# Patient Record
Sex: MALE | Race: WHITE | Employment: FULL TIME | URBAN - METROPOLITAN AREA
[De-identification: names, ages, dates, MRNs, and addresses within clinical notes are randomized per-mention and may not be internally consistent; named-entity substitution may affect disease eponyms.]

---

## 2017-05-15 ENCOUNTER — OFFICE VISIT (OUTPATIENT)
Dept: FAMILY MEDICINE | Facility: CLINIC | Age: 42
End: 2017-05-15
Payer: COMMERCIAL

## 2017-05-15 VITALS
BODY MASS INDEX: 27.3 KG/M2 | HEIGHT: 71 IN | WEIGHT: 195 LBS | OXYGEN SATURATION: 97 % | DIASTOLIC BLOOD PRESSURE: 66 MMHG | TEMPERATURE: 99.5 F | HEART RATE: 92 BPM | RESPIRATION RATE: 22 BRPM | SYSTOLIC BLOOD PRESSURE: 102 MMHG

## 2017-05-15 DIAGNOSIS — J45.20 INTERMITTENT ASTHMA, UNCOMPLICATED: ICD-10-CM

## 2017-05-15 DIAGNOSIS — J01.00 ACUTE NON-RECURRENT MAXILLARY SINUSITIS: Primary | ICD-10-CM

## 2017-05-15 DIAGNOSIS — R07.0 THROAT PAIN: ICD-10-CM

## 2017-05-15 LAB
DEPRECATED S PYO AG THROAT QL EIA: NORMAL
MICRO REPORT STATUS: NORMAL
SPECIMEN SOURCE: NORMAL

## 2017-05-15 PROCEDURE — 87081 CULTURE SCREEN ONLY: CPT | Performed by: PHYSICIAN ASSISTANT

## 2017-05-15 PROCEDURE — 87880 STREP A ASSAY W/OPTIC: CPT | Performed by: PHYSICIAN ASSISTANT

## 2017-05-15 PROCEDURE — 99213 OFFICE O/P EST LOW 20 MIN: CPT | Performed by: PHYSICIAN ASSISTANT

## 2017-05-15 RX ORDER — FLUTICASONE PROPIONATE 50 MCG
1-2 SPRAY, SUSPENSION (ML) NASAL DAILY
Qty: 1 BOTTLE | Refills: 3 | Status: SHIPPED | OUTPATIENT
Start: 2017-05-15 | End: 2019-03-19

## 2017-05-15 NOTE — LETTER
My Asthma Action Plan  Name: Sebastian Castro   YOB: 1975  Date: 5/15/2017   My doctor: Talya William PA-C   My clinic: [facility]        My Control Medicine:   My Rescue Medicine: Albuterol (Proair/Ventolin/Proventil) inhaler 108 (90 MCG/ACT)   My Asthma Severity: intermittent  Avoid your asthma triggers: upper respiratory infections               GREEN ZONE     Good Control    I feel good    No cough or wheeze    Can work, sleep and play without asthma symptoms       Take your asthma control medicine every day.     1. If exercise triggers your asthma, take your rescue medication    15 minutes before exercise or sports, and    During exercise if you have asthma symptoms  2. Spacer to use with inhaler: If you have a spacer, make sure to use it with your inhaler             YELLOW ZONE     Getting Worse  I have ANY of these:    I do not feel good    Cough or wheeze    Chest feels tight    Wake up at night   1. Keep taking your Green Zone medications  2. Start taking your rescue medicine:    every 20 minutes for up to 1 hour. Then every 4 hours for 24-48 hours.  3. If you stay in the Yellow Zone for more than 12-24 hours, contact your doctor.  4. If you do not return to the Green Zone in 12-24 hours or you get worse, start taking your oral steroid medicine if prescribed by your provider.           RED ZONE     Medical Alert - Get Help  I have ANY of these:    I feel awful    Medicine is not helping    Breathing getting harder    Trouble walking or talking    Nose opens wide to breathe       1. Take your rescue medicine NOW  2. If your provider has prescribed an oral steroid medicine, start taking it NOW  3. Call your doctor NOW  4. If you are still in the Red Zone after 20 minutes and you have not reached your doctor:    Take your rescue medicine again and    Call 911 or go to the emergency room right away    See your regular doctor within 2 weeks of an Emergency Room or Urgent  Care visit for follow-up treatment.        Electronically signed by: Gisselle Walsh, May 15, 2017    Annual Reminders:  Meet with Asthma Educator,  Flu Shot in the Fall, consider Pneumonia Vaccination for patients with asthma (aged 19 and older).    Pharmacy: [facility] [place], [place] - [address]                    Asthma Triggers  How To Control Things That Make Your Asthma Worse    Triggers are things that make your asthma worse.  Look at the list below to help you find your triggers and what you can do about them.  You can help prevent asthma flare-ups by staying away from your triggers.      Trigger                                                          What you can do   Cigarette Smoke  Tobacco smoke can make asthma worse. Do not allow smoking in your home, car or around you.  Be sure no one smokes at a child s day care or school.  If you smoke, ask your health care provider for ways to help you quit.  Ask family members to quit too.  Ask your health care provider for a referral to Quit Plan to help you quit smoking, or call 4-874-855-PLAN.     Colds, Flu, Bronchitis  These are common triggers of asthma. Wash your hands often.  Don t touch your eyes, nose or mouth.  Get a flu shot every year.     Dust Mites  These are tiny bugs that live in cloth or carpet. They are too small to see. Wash sheets and blankets in hot water every week.   Encase pillows and mattress in dust mite proof covers.  Avoid having carpet if you can. If you have carpet, vacuum weekly.   Use a dust mask and HEPA vacuum.   Pollen and Outdoor Mold  Some people are allergic to trees, grass, or weed pollen, or molds. Try to keep your windows closed.  Limit time out doors when pollen count is high.   Ask you health care provider about taking medicine during allergy season.     Animal Dander  Some people are allergic to skin flakes, urine or saliva from pets with fur or feathers. Keep pets with fur or feathers out of your  home.    If you can t keep the pet outdoors, then keep the pet out of your bedroom.  Keep the bedroom door closed.  Keep pets off cloth furniture and away from stuffed toys.     Mice, Rats, and Cockroaches  Some people are allergic to the waste from these pests.   Cover food and garbage.  Clean up spills and food crumbs.  Store grease in the refrigerator.   Keep food out of the bedroom.   Indoor Mold  This can be a trigger if your home has high moisture. Fix leaking faucets, pipes, or other sources of water.   Clean moldy surfaces.  Dehumidify basement if it is damp and smelly.   Smoke, Strong Odors, and Sprays  These can reduce air quality. Stay away from strong odors and sprays, such as perfume, powder, hair spray, paints, smoke incense, paint, cleaning products, candles and new carpet.   Exercise or Sports  Some people with asthma have this trigger. Be active!  Ask your doctor about taking medicine before sports or exercise to prevent symptoms.    Warm up for 5-10 minutes before and after sports or exercise.     Other Triggers of Asthma  Cold air:  Cover your nose and mouth with a scarf.  Sometimes laughing or crying can be a trigger.  Some medicines and food can trigger asthma.

## 2017-05-15 NOTE — MR AVS SNAPSHOT
After Visit Summary   5/15/2017    Sebastian Castro    MRN: [id number]           Patient Information     Date Of Birth          1975        Visit Information        Provider Department      5/15/2017 2:20 PM Talya William PA-C [facility]        Today's Diagnoses     Acute non-recurrent maxillary sinusitis    -  1    Throat pain        Intermittent asthma, uncomplicated        CARDIOVASCULAR SCREENING; LDL GOAL LESS THAN 160          Care Instructions    Encouraged mucinex/guafenisin, warm salt water gargles, cepacol spray, soothers/lozenges, sinus rinses (neilmed), flonase (2 sprays per nostril daily x 2 weeks), vitamin c, fluids and rest.  May alternate tylenol and NSAIDS (ibuprofen, advil, aleve type products) every 4-6 hours for the next few days as needed.    No need for oral antibiotic at this time, printed option though if no improvement in 5-7 days.  Return to clinic with any worsening or changes in symptoms.         Follow-ups after your visit        Follow-up notes from your care team     Return if symptoms worsen or fail to improve.      Who to contact     If you have questions or need follow up information about today's clinic visit or your schedule please contact [facility] directly at [phone].  Normal or non-critical lab and imaging results will be communicated to you by EpiVaxhart, letter or phone within 4 business days after the clinic has received the results. If you do not hear from us within 7 days, please contact the clinic through EpiVaxhart or phone. If you have a critical or abnormal lab result, we will notify you by phone as soon as possible.  Submit refill requests through [employer] Cheetah or call your pharmacy and they will forward the refill request to us. Please allow 3 business days for your refill to be completed.          Additional Information About Your Visit        White Cheetah Information     White Cheetah lets you send messages to your doctor,  "view your test results, renew your prescriptions, schedule appointments and more. To sign up, go to [email or web address] . Click on \"Log in\" on the left side of the screen, which will take you to the Welcome page. Then click on \"Sign up Now\" on the right side of the page.     You will be asked to enter the access code listed below, as well as some personal information. Please follow the directions to create your username and password.     Your access code is: [email or web address]  Expires: 2017  2:47 PM     Your access code will  in 90 days. If you need help or a new code, please call your [facility] clinic or [phone].        Care EveryWhere ID     This is your Care EveryWhere ID. This could be used by other organizations to access your [facility] medical records  [id number]        Your Vitals Were     Pulse Temperature Respirations Height Pulse Oximetry BMI (Body Mass Index)    92 99.5  F (37.5  C) (Tympanic) 22 5' 11\" (1.803 m) 97% 27.2 kg/m2       Blood Pressure from Last 3 Encounters:   05/15/17 102/66   16 115/67   10/06/15 132/69    Weight from Last 3 Encounters:   05/15/17 195 lb (88.5 kg)   16 201 lb (91.2 kg)   10/06/15 210 lb (95.3 kg)              We Performed the Following     Asthma Action Plan (AAP)     Beta strep group A culture     Strep, Rapid Screen          Today's Medication Changes          These changes are accurate as of: 5/15/17  2:47 PM.  If you have any questions, ask your nurse or doctor.               Start taking these medicines.        Dose/Directions    fluticasone 50 MCG/ACT spray   Commonly known as:  FLONASE   Used for:  Acute non-recurrent maxillary sinusitis   Started by:  Talya William PA-C        Dose:  1-2 spray   Spray 1-2 sprays into both nostrils daily   Quantity:  1 Bottle   Refills:  3         These medicines have changed or have updated prescriptions.        Dose/Directions    albuterol 108 (90 BASE) MCG/ACT Inhaler   Commonly known as:  " PROAIR HFA/PROVENTIL HFA/VENTOLIN HFA   This may have changed:  Another medication with the same name was removed. Continue taking this medication, and follow the directions you see here.   Used for:  Acute sinusitis with symptoms > 10 days   Changed by:  Albina Joseph PA-C        Dose:  2 puff   Inhale 2 puffs into the lungs every 6 hours as needed for shortness of breath / dyspnea or wheezing   Quantity:  1 Inhaler   Refills:  0            Where to get your medicines      These medications were sent to [facility] [place], [place] - [address]  [address] [facility] [address]     Phone:  [phone]     fluticasone 50 MCG/ACT spray         Some of these will need a paper prescription and others can be bought over the counter.  Ask your nurse if you have questions.     Bring a paper prescription for each of these medications     amoxicillin-clavulanate 875-125 MG per tablet                Primary Care Provider Office Phone # Fax #    Goran Harrison [phone]       [facility] [address] [facility] [address]        Thank you!     Thank you for choosing [facility]  for your care. Our goal is always to provide you with excellent care. Hearing back from our patients is one way we can continue to improve our services. Please take a few minutes to complete the written survey that you may receive in the mail after your visit with us. Thank you!             Your Updated Medication List - Protect others around you: Learn how to safely use, store and throw away your medicines at www.disposemymeds.org.          This list is accurate as of: 5/15/17  2:47 PM.  Always use your most recent med list.                   Brand Name Dispense Instructions for use    albuterol 108 (90 BASE) MCG/ACT Inhaler    PROAIR HFA/PROVENTIL HFA/VENTOLIN HFA    1 Inhaler    Inhale 2 puffs into the lungs every 6 hours as needed for shortness of breath /  dyspnea or wheezing       amoxicillin-clavulanate 875-125 MG per tablet    AUGMENTIN    20 tablet    Take 1 tablet by mouth 2 times daily       fluticasone 50 MCG/ACT spray    FLONASE    1 Bottle    Spray 1-2 sprays into both nostrils daily       Multi-vitamin Tabs tablet      Take 1 tablet by mouth daily

## 2017-05-15 NOTE — NURSING NOTE
"Chief Complaint   Patient presents with     Pharyngitis       Initial /66 (BP Location: Left arm, Patient Position: Chair, Cuff Size: Adult Large)  Pulse 92  Temp 99.5  F (37.5  C) (Tympanic)  Resp 22  Ht 5' 11\" (1.803 m)  Wt 195 lb (88.5 kg)  SpO2 97%  BMI 27.2 kg/m2 Estimated body mass index is 27.2 kg/(m^2) as calculated from the following:    Height as of this encounter: 5' 11\" (1.803 m).    Weight as of this encounter: 195 lb (88.5 kg).  Medication Reconciliation: complete     Gisselle Walsh CMA  "

## 2017-05-15 NOTE — LETTER
"[facility]   [address] [place]   [address]  [phone]      May 15, 2017      RE: Sebastian VALDOVINOS Pengindia  [address] [facility] [address]       To whom it may concern:    Sebastian Castro was seen in our clinic today. He may return to work on or about 5/17/2017.          Sincerely,    Talya \"Felipe\" Stewart OBANDO          "

## 2017-05-15 NOTE — PROGRESS NOTES
SUBJECTIVE:                                                    Sebastian Castro is a 42 year old male who presents to clinic today for the following health issues:    RESPIRATORY SYMPTOMS      Duration: 3 days    Description  sore throat, cough and ear pain both    Severity: moderate    Accompanying signs and symptoms: fatigue, pain in neck left and right sides below the ears, no appetite, white bumps at the back of throat     History (predisposing factors):  asthma    Precipitating or alleviating factors: None    Therapies tried and outcome:  none           Problem list and histories reviewed & adjusted, as indicated.  Additional history: as documented    Patient Active Problem List   Diagnosis     Other, mixed, or unspecified nondependent drug abuse, unspecified     Tobacco use disorder     CARDIOVASCULAR SCREENING; LDL GOAL LESS THAN 160     Intermittent asthma     Past Surgical History:   Procedure Laterality Date     NO HISTORY OF SURGERY         Social History   Substance Use Topics     Smoking status: Former Smoker     Years: 20.00     Types: Cigarettes     Smokeless tobacco: Never Used     Alcohol use Yes      Comment: 3 drinks per week     Family History   Problem Relation Age of Onset     C.A.D. Father 66      of MI     Alcohol/Drug Father          Current Outpatient Prescriptions   Medication Sig Dispense Refill     amoxicillin-clavulanate (AUGMENTIN) 875-125 MG per tablet Take 1 tablet by mouth 2 times daily 20 tablet 0     fluticasone (FLONASE) 50 MCG/ACT spray Spray 1-2 sprays into both nostrils daily 1 Bottle 3     albuterol (PROAIR HFA, PROVENTIL HFA, VENTOLIN HFA) 108 (90 BASE) MCG/ACT inhaler Inhale 2 puffs into the lungs every 6 hours as needed for shortness of breath / dyspnea or wheezing 1 Inhaler 0     multivitamin, therapeutic with minerals (MULTI-VITAMIN) TABS Take 1 tablet by mouth daily       Allergies   Allergen Reactions     Cats        Reviewed and updated as needed this visit by  "clinical staff  Tobacco  Allergies  Meds  Problems  Med Hx  Surg Hx  Fam Hx  Soc Hx        Reviewed and updated as needed this visit by Provider  Allergies  Meds  Problems         ROS:  Constitutional, HEENT, cardiovascular, pulmonary, gi and gu systems are negative, except as otherwise noted.    OBJECTIVE:                                                    /66 (BP Location: Left arm, Patient Position: Chair, Cuff Size: Adult Large)  Pulse 92  Temp 99.5  F (37.5  C) (Tympanic)  Resp 22  Ht 5' 11\" (1.803 m)  Wt 195 lb (88.5 kg)  SpO2 97%  BMI 27.2 kg/m2  Body mass index is 27.2 kg/(m^2).  GENERAL: healthy, alert and no distress  EYES: Eyes grossly normal to inspection, PERRL and conjunctivae and sclerae normal  HENT: ear canals and TM's normal, nose and mouth without ulcers or lesions  NECK: no adenopathy, no asymmetry, masses, or scars and thyroid normal to palpation  RESP: lungs clear to auscultation - no rales, rhonchi or wheezes  CV: regular rate and rhythm, normal S1 S2, no S3 or S4, no murmur, click or rub, no peripheral edema and peripheral pulses strong  PSYCH: mentation appears normal, affect normal/bright    Diagnostic Test Results:  Strep test - negative     ASSESSMENT/PLAN:                                                        ICD-10-CM    1. Acute non-recurrent maxillary sinusitis J01.00 amoxicillin-clavulanate (AUGMENTIN) 875-125 MG per tablet     fluticasone (FLONASE) 50 MCG/ACT spray   2. Throat pain R07.0 Strep, Rapid Screen     Beta strep group A culture   3. Intermittent asthma, uncomplicated J45.20 Asthma Action Plan (AAP)       Patient Instructions   Encouraged mucinex/guafenisin, warm salt water gargles, cepacol spray, soothers/lozenges, sinus rinses (neilmed), flonase (2 sprays per nostril daily x 2 weeks), vitamin c, fluids and rest.  May alternate tylenol and NSAIDS (ibuprofen, advil, aleve type products) every 4-6 hours for the next few days as needed.    No need for " oral antibiotic at this time, printed option though if no improvement in 5-7 days.  Return to clinic with any worsening or changes in symptoms.     Talya William PA-C  [facility]

## 2017-05-15 NOTE — PATIENT INSTRUCTIONS
Encouraged mucinex/guafenisin, warm salt water gargles, cepacol spray, soothers/lozenges, sinus rinses (neilmed), flonase (2 sprays per nostril daily x 2 weeks), vitamin c, fluids and rest.  May alternate tylenol and NSAIDS (ibuprofen, advil, aleve type products) every 4-6 hours for the next few days as needed.    No need for oral antibiotic at this time, printed option though if no improvement in 5-7 days.  Return to clinic with any worsening or changes in symptoms.

## 2017-05-16 LAB
BACTERIA SPEC CULT: NORMAL
MICRO REPORT STATUS: NORMAL
SPECIMEN SOURCE: NORMAL

## 2017-05-16 ASSESSMENT — ASTHMA QUESTIONNAIRES: ACT_TOTALSCORE: 23

## 2019-03-19 ENCOUNTER — OFFICE VISIT (OUTPATIENT)
Dept: FAMILY MEDICINE | Facility: CLINIC | Age: 44
End: 2019-03-19
Payer: COMMERCIAL

## 2019-03-19 VITALS
HEART RATE: 69 BPM | WEIGHT: 216 LBS | DIASTOLIC BLOOD PRESSURE: 62 MMHG | TEMPERATURE: 97.9 F | SYSTOLIC BLOOD PRESSURE: 109 MMHG | BODY MASS INDEX: 30.13 KG/M2 | OXYGEN SATURATION: 97 %

## 2019-03-19 DIAGNOSIS — J06.9 URI WITH COUGH AND CONGESTION: Primary | ICD-10-CM

## 2019-03-19 DIAGNOSIS — F17.200 TOBACCO USE DISORDER: ICD-10-CM

## 2019-03-19 DIAGNOSIS — F43.21 GRIEF: ICD-10-CM

## 2019-03-19 DIAGNOSIS — R53.83 FATIGUE, UNSPECIFIED TYPE: ICD-10-CM

## 2019-03-19 DIAGNOSIS — J45.20 MILD INTERMITTENT ASTHMA WITHOUT COMPLICATION: ICD-10-CM

## 2019-03-19 PROCEDURE — 99214 OFFICE O/P EST MOD 30 MIN: CPT | Performed by: FAMILY MEDICINE

## 2019-03-19 RX ORDER — ALBUTEROL SULFATE 90 UG/1
2 AEROSOL, METERED RESPIRATORY (INHALATION) EVERY 6 HOURS PRN
Qty: 6.7 G | Refills: 0 | Status: CANCELLED | OUTPATIENT
Start: 2019-03-19

## 2019-03-19 RX ORDER — BENZONATATE 200 MG/1
200 CAPSULE ORAL 3 TIMES DAILY PRN
Qty: 21 CAPSULE | Refills: 0 | Status: SHIPPED | OUTPATIENT
Start: 2019-03-19 | End: 2019-03-26

## 2019-03-19 NOTE — LETTER
My Asthma Action Plan  Name: Sebastian Castro   YOB: 1975  Date: 3/19/2019   My doctor: Anu Carter MD   My clinic: [facility]        My Control Medicine: None  My Rescue Medicine: Albuterol (Proair/Ventolin/Proventil) inhaler 2 puffs every 6 hrs as needed   My Asthma Severity: intermittent  Avoid your asthma triggers: upper respiratory infections               GREEN ZONE   Good Control    I feel good    No cough or wheeze    Can work, sleep and play without asthma symptoms       Take your asthma control medicine every day.     1. If exercise triggers your asthma, take your rescue medication    15 minutes before exercise or sports, and    During exercise if you have asthma symptoms  2. Spacer to use with inhaler: If you have a spacer, make sure to use it with your inhaler             YELLOW ZONE Getting Worse  I have ANY of these:    I do not feel good    Cough or wheeze    Chest feels tight    Wake up at night   1. Keep taking your Green Zone medications  2. Start taking your rescue medicine:    every 20 minutes for up to 1 hour. Then every 4 hours for 24-48 hours.  3. If you stay in the Yellow Zone for more than 12-24 hours, contact your doctor.  4. If you do not return to the Green Zone in 12-24 hours or you get worse, start taking your oral steroid medicine if prescribed by your provider.           RED ZONE Medical Alert - Get Help  I have ANY of these:    I feel awful    Medicine is not helping    Breathing getting harder    Trouble walking or talking    Nose opens wide to breathe       1. Take your rescue medicine NOW  2. If your provider has prescribed an oral steroid medicine, start taking it NOW  3. Call your doctor NOW  4. If you are still in the Red Zone after 20 minutes and you have not reached your doctor:    Take your rescue medicine again and    Call 911 or go to the emergency room right away    See your regular doctor within 2 weeks of an Emergency Room or Urgent Care  visit for follow-up treatment.          Annual Reminders:  Meet with Asthma Educator,  Flu Shot in the Fall, consider Pneumonia Vaccination for patients with asthma (aged 19 and older).    [place]arm[place]: [facility] [place] - [address]                      Asthma Triggers  How To Control Things That Make Your Asthma Worse    Triggers are things that make your asthma worse.  Look at the list below to help you find your triggers and what you can do about them.  You can help prevent asthma flare-ups by staying away from your triggers.      Trigger                                                          What you can do   Cigarette Smoke  Tobacco smoke can make asthma worse. Do not allow smoking in your home, car or around you.  Be sure no one smokes at a child s day care or school.  If you smoke, ask your health care provider for ways to help you quit.  Ask family members to quit too.  Ask your health care provider for a referral to Quit Plan to help you quit smoking, or call 8-195-499-PLAN.     Colds, Flu, Bronchitis  These are common triggers of asthma. Wash your hands often.  Don t touch your eyes, nose or mouth.  Get a flu shot every year.     Dust Mites  These are tiny bugs that live in cloth or carpet. They are too small to see. Wash sheets and blankets in hot water every week.   Encase pillows and mattress in dust mite proof covers.  Avoid having carpet if you can. If you have carpet, vacuum weekly.   Use a dust mask and HEPA vacuum.   Pollen and Outdoor Mold  Some people are allergic to trees, grass, or weed pollen, or molds. Try to keep your windows closed.  Limit time out doors when pollen count is high.   Ask you health care provider about taking medicine during allergy season.     Animal Dander  Some people are allergic to skin flakes, urine or saliva from pets with fur or feathers. Keep pets with fur or feathers out of your home.    If you can t keep the pet outdoors, then keep the  pet out of your bedroom.  Keep the bedroom door closed.  Keep pets off cloth furniture and away from stuffed toys.     Mice, Rats, and Cockroaches  Some people are allergic to the waste from these pests.   Cover food and garbage.  Clean up spills and food crumbs.  Store grease in the refrigerator.   Keep food out of the bedroom.   Indoor Mold  This can be a trigger if your home has high moisture. Fix leaking faucets, pipes, or other sources of water.   Clean moldy surfaces.  Dehumidify basement if it is damp and smelly.   Smoke, Strong Odors, and Sprays  These can reduce air quality. Stay away from strong odors and sprays, such as perfume, powder, hair spray, paints, smoke incense, paint, cleaning products, candles and new carpet.   Exercise or Sports  Some people with asthma have this trigger. Be active!  Ask your doctor about taking medicine before sports or exercise to prevent symptoms.    Warm up for 5-10 minutes before and after sports or exercise.     Other Triggers of Asthma  Cold air:  Cover your nose and mouth with a scarf.  Sometimes laughing or crying can be a trigger.  Some medicines and food can trigger asthma.
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
## 2019-03-19 NOTE — PATIENT INSTRUCTIONS
Viral bronchitis   No pneumonia  possible influenza but no treatment needed given  Tessalon three times a day for cough  Try below  If not better in 5 days call about an antibiotic   flonase 1 spray each nostril daily 2 weeks  Zyrtec 10 mg daily 2 week  mucinex 600 mg twice a day 1 week  Humidifier in room may help  supportive care as below  Go to the Er if worse  Follow up with primary if symptoms persist   acute grief playing a role  Smoking cessation when feeling better     Your symptoms and exam today indicate that you have a viral upper respiratory illness.  This includes viral rhinosinusitis and viral bronchitis.  Antibiotics do not help viral illnesses; the best remedies treat the symptoms (see below).  The typical course of a viral illness is that you feel rather miserable for the first few days - with sore throat, runny nose/nasal congestion, cough, and sometimes fever and body aches.  You should start to feel better after about 5-7 days and much better by 10-14 days.  If you develop sudden worsening of symptoms or fever after the first 5-7 days, or if you have persistence of your symptoms beyond 14 days, let us know as you may have developed a secondary bacterial infection.      For symptom relief I suggest tryin. Steam.  Take a long, hot shower.  Or if you don't want to get in the shower just run it with the bathroom door shut for a few minutes and breathe the steam.  2. Drink hot liquids frequently such as tea or hot water with honey and lemon.  3. Acetaminophen (Tylenol) and ibuprofen (Motrin or Advil) as needed for headache, sore throat, body aches, or fever.  4. For loosening phlegm and sputum try guaifenesin (available in many combination products and alone as plain Robitussin or plain Mucinex) and for cough suppression you can try dextromethorphan (Delsym or combined in other products).  5. For nasal congestion try:    An oral decongestant.  The only decongestant I recommend is pseudoephedrine.  Ask the pharmacist for the over the counter (but real) pseudoephedrine - not phenylephrine.  This can raise your blood pressure and heart rate so do not use this if you have hypertension.       Afrin spray for 3 days.  (Never use afrin nasal spray for more than 3 days as there is a risk of developing tolerance and rebound/worsening nasal congestion if used longer than this.)    Nealmed sinus rinses.    Nasal steroid spray such as nasacort or flonase, which are over-the-counter.  6. And most importantly: plenty of rest and sleep  especially while you have a fever.     Stop smoking and avoid secondhand smoke    Drink lots of fluids such as water and clear soups. Fluids help loosen mucus. Fluids are also important because they help prevent dehydration.    Gargle with warm salt water a few times a day to relieve a sore throat. Throat sprays or lozenges may also help relieve the pain.    Avoid alcohol.    Use saline (salt water) nose drops to help loosen mucus and moisten the tender skin in your nose.  Encouraged mucinex, warm salt water gargles, cepacol spray, soothers/lozenges, sinus rinses (neilmed), flonase (2 sprays per nostril daily x 2 weeks), vitamin c, fluids and rest.  May alternate tylenol and NSAIDS (ibuprofen, advil, aleve type products) every 4-6 hours for the next few days as needed.   No need for oral antibiotic at this time.

## 2019-03-20 ASSESSMENT — ASTHMA QUESTIONNAIRES: ACT_TOTALSCORE: 23

## 2019-04-09 ENCOUNTER — HOSPITAL ENCOUNTER (EMERGENCY)
Facility: CLINIC | Age: 44
Discharge: HOME | End: 2019-04-09
Attending: EMERGENCY MEDICINE | Admitting: EMERGENCY MEDICINE
Payer: COMMERCIAL

## 2019-04-09 ENCOUNTER — APPOINTMENT (OUTPATIENT)
Dept: GENERAL RADIOLOGY | Facility: CLINIC | Age: 44
End: 2019-04-09
Attending: EMERGENCY MEDICINE
Payer: COMMERCIAL

## 2019-04-09 VITALS
HEIGHT: 72 IN | HEART RATE: 76 BPM | WEIGHT: 213.5 LBS | TEMPERATURE: 98.9 F | RESPIRATION RATE: 16 BRPM | SYSTOLIC BLOOD PRESSURE: 100 MMHG | OXYGEN SATURATION: 98 % | DIASTOLIC BLOOD PRESSURE: 64 MMHG | BODY MASS INDEX: 28.92 KG/M2

## 2019-04-09 DIAGNOSIS — J45.21 EXACERBATION OF INTERMITTENT ASTHMA, UNSPECIFIED ASTHMA SEVERITY: ICD-10-CM

## 2019-04-09 DIAGNOSIS — R06.00 DYSPNEA, UNSPECIFIED TYPE: ICD-10-CM

## 2019-04-09 LAB
ALBUMIN SERPL-MCNC: 4.3 G/DL (ref 3.4–5)
ALP SERPL-CCNC: 69 U/L (ref 40–150)
ALT SERPL W P-5'-P-CCNC: 33 U/L (ref 0–70)
ANION GAP SERPL CALCULATED.3IONS-SCNC: 12 MMOL/L (ref 3–14)
AST SERPL W P-5'-P-CCNC: 11 U/L (ref 0–45)
BASE EXCESS BLDV CALC-SCNC: 0.4 MMOL/L
BASOPHILS # BLD AUTO: 0.1 10E9/L (ref 0–0.2)
BASOPHILS NFR BLD AUTO: 0.4 %
BILIRUB SERPL-MCNC: 0.6 MG/DL (ref 0.2–1.3)
BUN SERPL-MCNC: 10 MG/DL (ref 7–30)
CALCIUM SERPL-MCNC: 9 MG/DL (ref 8.5–10.1)
CHLORIDE SERPL-SCNC: 108 MMOL/L (ref 94–109)
CO2 SERPL-SCNC: 20 MMOL/L (ref 20–32)
CREAT SERPL-MCNC: 0.9 MG/DL (ref 0.66–1.25)
DIFFERENTIAL METHOD BLD: ABNORMAL
EOSINOPHIL # BLD AUTO: 0.2 10E9/L (ref 0–0.7)
EOSINOPHIL NFR BLD AUTO: 1.4 %
ERYTHROCYTE [DISTWIDTH] IN BLOOD BY AUTOMATED COUNT: 12.8 % (ref 10–15)
GFR SERPL CREATININE-BSD FRML MDRD: >90 ML/MIN/{1.73_M2}
GLUCOSE SERPL-MCNC: 104 MG/DL (ref 70–99)
HCO3 BLDV-SCNC: 22 MMOL/L (ref 21–28)
HCT VFR BLD AUTO: 42.7 % (ref 40–53)
HGB BLD-MCNC: 14.9 G/DL (ref 13.3–17.7)
IMM GRANULOCYTES # BLD: 0.1 10E9/L (ref 0–0.4)
IMM GRANULOCYTES NFR BLD: 0.4 %
LYMPHOCYTES # BLD AUTO: 3.2 10E9/L (ref 0.8–5.3)
LYMPHOCYTES NFR BLD AUTO: 23.1 %
MCH RBC QN AUTO: 31.9 PG (ref 26.5–33)
MCHC RBC AUTO-ENTMCNC: 34.9 G/DL (ref 31.5–36.5)
MCV RBC AUTO: 91 FL (ref 78–100)
MONOCYTES # BLD AUTO: 0.9 10E9/L (ref 0–1.3)
MONOCYTES NFR BLD AUTO: 6.3 %
NEUTROPHILS # BLD AUTO: 9.5 10E9/L (ref 1.6–8.3)
NEUTROPHILS NFR BLD AUTO: 68.4 %
NRBC # BLD AUTO: 0 10*3/UL
NRBC BLD AUTO-RTO: 0 /100
O2/TOTAL GAS SETTING VFR VENT: ABNORMAL %
PCO2 BLDV: 27 MM HG (ref 40–50)
PH BLDV: 7.52 PH (ref 7.32–7.43)
PLATELET # BLD AUTO: 266 10E9/L (ref 150–450)
PO2 BLDV: 64 MM HG (ref 25–47)
POTASSIUM SERPL-SCNC: 3.8 MMOL/L (ref 3.4–5.3)
PROT SERPL-MCNC: 7.2 G/DL (ref 6.8–8.8)
RBC # BLD AUTO: 4.67 10E12/L (ref 4.4–5.9)
SODIUM SERPL-SCNC: 140 MMOL/L (ref 133–144)
TROPONIN I SERPL-MCNC: <0.015 UG/L (ref 0–0.04)
WBC # BLD AUTO: 13.9 10E9/L (ref 4–11)

## 2019-04-09 PROCEDURE — 84484 ASSAY OF TROPONIN QUANT: CPT | Performed by: EMERGENCY MEDICINE

## 2019-04-09 PROCEDURE — 94640 AIRWAY INHALATION TREATMENT: CPT | Performed by: EMERGENCY MEDICINE

## 2019-04-09 PROCEDURE — 93005 ELECTROCARDIOGRAM TRACING: CPT | Mod: 59 | Performed by: EMERGENCY MEDICINE

## 2019-04-09 PROCEDURE — 80053 COMPREHEN METABOLIC PANEL: CPT | Performed by: EMERGENCY MEDICINE

## 2019-04-09 PROCEDURE — 96360 HYDRATION IV INFUSION INIT: CPT | Mod: 59 | Performed by: EMERGENCY MEDICINE

## 2019-04-09 PROCEDURE — 25000132 ZZH RX MED GY IP 250 OP 250 PS 637

## 2019-04-09 PROCEDURE — 25000125 ZZHC RX 250: Performed by: EMERGENCY MEDICINE

## 2019-04-09 PROCEDURE — 99285 EMERGENCY DEPT VISIT HI MDM: CPT | Mod: 25 | Performed by: EMERGENCY MEDICINE

## 2019-04-09 PROCEDURE — 71046 X-RAY EXAM CHEST 2 VIEWS: CPT

## 2019-04-09 PROCEDURE — 93308 TTE F-UP OR LMTD: CPT | Performed by: EMERGENCY MEDICINE

## 2019-04-09 PROCEDURE — 93010 ELECTROCARDIOGRAM REPORT: CPT | Performed by: EMERGENCY MEDICINE

## 2019-04-09 PROCEDURE — 96361 HYDRATE IV INFUSION ADD-ON: CPT | Performed by: EMERGENCY MEDICINE

## 2019-04-09 PROCEDURE — 25000131 ZZH RX MED GY IP 250 OP 636 PS 637: Performed by: EMERGENCY MEDICINE

## 2019-04-09 PROCEDURE — 82803 BLOOD GASES ANY COMBINATION: CPT | Performed by: EMERGENCY MEDICINE

## 2019-04-09 PROCEDURE — 25800030 ZZH RX IP 258 OP 636: Performed by: EMERGENCY MEDICINE

## 2019-04-09 PROCEDURE — 85025 COMPLETE CBC W/AUTO DIFF WBC: CPT | Performed by: EMERGENCY MEDICINE

## 2019-04-09 PROCEDURE — 25000125 ZZHC RX 250

## 2019-04-09 RX ORDER — PREDNISONE 20 MG/1
TABLET ORAL
Qty: 8 TABLET | Refills: 0 | Status: SHIPPED | OUTPATIENT
Start: 2019-04-09 | End: 2019-10-08

## 2019-04-09 RX ORDER — ASPIRIN 325 MG
325 TABLET ORAL ONCE
Status: COMPLETED | OUTPATIENT
Start: 2019-04-09 | End: 2019-04-09

## 2019-04-09 RX ORDER — PREDNISONE 20 MG/1
TABLET ORAL
Qty: 10 TABLET | Refills: 0 | Status: SHIPPED | OUTPATIENT
Start: 2019-04-09 | End: 2019-04-09

## 2019-04-09 RX ORDER — IPRATROPIUM BROMIDE AND ALBUTEROL SULFATE 2.5; .5 MG/3ML; MG/3ML
3 SOLUTION RESPIRATORY (INHALATION) ONCE
Status: COMPLETED | OUTPATIENT
Start: 2019-04-09 | End: 2019-04-09

## 2019-04-09 RX ORDER — PREDNISONE 20 MG/1
40 TABLET ORAL ONCE
Status: COMPLETED | OUTPATIENT
Start: 2019-04-09 | End: 2019-04-09

## 2019-04-09 RX ORDER — ASPIRIN 81 MG/1
TABLET, CHEWABLE ORAL
Status: COMPLETED
Start: 2019-04-09 | End: 2019-04-09

## 2019-04-09 RX ORDER — IPRATROPIUM BROMIDE AND ALBUTEROL SULFATE 2.5; .5 MG/3ML; MG/3ML
SOLUTION RESPIRATORY (INHALATION)
Status: COMPLETED
Start: 2019-04-09 | End: 2019-04-09

## 2019-04-09 RX ADMIN — Medication 324 MG: at 18:53

## 2019-04-09 RX ADMIN — IPRATROPIUM BROMIDE AND ALBUTEROL SULFATE 3 ML: .5; 3 SOLUTION RESPIRATORY (INHALATION) at 19:20

## 2019-04-09 RX ADMIN — SODIUM CHLORIDE 1000 ML: 9 INJECTION, SOLUTION INTRAVENOUS at 20:11

## 2019-04-09 RX ADMIN — IPRATROPIUM BROMIDE AND ALBUTEROL SULFATE: .5; 3 SOLUTION RESPIRATORY (INHALATION) at 18:46

## 2019-04-09 RX ADMIN — ASPIRIN 81 MG 324 MG: 81 TABLET ORAL at 18:53

## 2019-04-09 RX ADMIN — PREDNISONE 40 MG: 20 TABLET ORAL at 19:20

## 2019-04-09 ASSESSMENT — MIFFLIN-ST. JEOR: SCORE: 1891.43

## 2019-04-09 NOTE — ED AVS SNAPSHOT
[facility], [place], Emergency Department  [address]  [place] [address]  Phone:  [phone]  Fax:  [phone]                                    Sebastian Castro   MRN: [id number]    Department:  [facility], Emergency Department   Date of Visit:  4/9/2019           After Visit Summary Signature Page    I have received my discharge instructions, and my questions have been answered. I have discussed any challenges I see with this plan with the nurse or doctor.    ..........................................................................................................................................  Patient/Patient Representative Signature      ..........................................................................................................................................  Patient Representative Print Name and Relationship to Patient    ..................................................               ................................................  Date                                   Time    ..........................................................................................................................................  Reviewed by Signature/Title    ...................................................              ..............................................  Date                                               Time          22EPIC Rev 08/18

## 2019-04-09 NOTE — LETTER
April 9, 2019      To Whom It May Concern:      Sebastian Castro was seen in our Emergency Department today, 04/09/19.  I expect his condition to improve over the next 2-3 days.  He may return to work/school when improved.    Sincerely,        Lisa Lewis MD

## 2019-04-10 LAB — INTERPRETATION ECG - MUSE: NORMAL

## 2019-04-10 NOTE — DISCHARGE INSTRUCTIONS
Take prednisone 40 mg once daily for the next four days starting tomorrow  Albuterol inhaler every 4-6 hours as needed for difficulty breathing  Follow up with your primary doctor in 2-3 days  Return to the emergency department for worsening chest pain, nausea/vomiting, fever, difficulty breathing, or if you have any new or changing symptoms/concerns.

## 2019-04-10 NOTE — ED PROVIDER NOTES
History     Chief Complaint   Patient presents with     Chest Wall Pain     Pt has had a URI     HPI  Sebastian Castro is a 44 year old male who presented with dyspnea.  He is a daily smoker with a history of reactive airway disease.  He has felt ill for the past few days with cough and congestion.  Today he is complaining of gradual progressive worsening of whole body aches and dyspnea associated with cough and chest tightness. He denies fever, c/o chills.  No recent travel, rashes, leg swelling, no history of blood clots, no hemoptysis.  No history of cardiac disease.    I have reviewed the Medications, Allergies, Past Medical and Surgical History, and Social History in the Epic system.    Review of Systems   Constitutional: Negative for fever.   HENT: Positive for congestion.    Eyes: Negative for visual disturbance.   Respiratory: Positive for cough, chest tightness and shortness of breath.    Cardiovascular: Negative for chest pain and leg swelling.   Gastrointestinal: Negative for abdominal pain, diarrhea, nausea and vomiting.   Genitourinary: Negative for dysuria.   Musculoskeletal: Negative for neck pain and neck stiffness.   Skin: Negative for rash.   Neurological: Negative for weakness, numbness and headaches.   Psychiatric/Behavioral: Negative for confusion.       Physical Exam   BP: 118/82  Pulse: 86  Temp: 98.7  F (37.1  C)  Resp: 22  Height: 182.9 cm (6')  Weight: 96.8 kg (213 lb 8 oz)  SpO2: 99 %      Physical Exam   Constitutional: He is oriented to person, place, and time. He appears well-developed and well-nourished.   HENT:   Head: Normocephalic and atraumatic.   Mouth/Throat: Oropharynx is clear and moist.   Eyes: Pupils are equal, round, and reactive to light.   Neck: Normal range of motion.   Cardiovascular: Normal rate and regular rhythm.   Pulmonary/Chest:   Tachypneic, scattered wheezes   Abdominal: Soft.   Musculoskeletal: Normal range of motion. He exhibits no edema.   Neurological: He is  alert and oriented to person, place, and time.   Skin: Skin is warm and dry.   Psychiatric:   anxious       ED Course        Procedures             EKG Interpretation:      Interpreted by Lisa Lewis  Time reviewed: 1830  Symptoms at time of EKG: dyspnea   Rhythm: normal sinus   Rate: normal  Axis: normal  Ectopy: none  Conduction: normal  ST Segments/ T Waves: No ST-T wave changes  Q Waves: none  Comparison to prior: sinus stacy no longer present    Clinical Impression: normal EKG          Critical Care time:  none             Labs Ordered and Resulted from Time of ED Arrival Up to the Time of Departure from the ED   CBC WITH PLATELETS DIFFERENTIAL - Abnormal; Notable for the following components:       Result Value    WBC 13.9 (*)     Absolute Neutrophil 9.5 (*)     All other components within normal limits   COMPREHENSIVE METABOLIC PANEL - Abnormal; Notable for the following components:    Glucose 104 (*)     All other components within normal limits   BLOOD GAS VENOUS - Abnormal; Notable for the following components:    Ph Venous 7.52 (*)     PCO2 Venous 27 (*)     PO2 Venous 64 (*)     All other components within normal limits   TROPONIN I            Assessments & Plan (with Medical Decision Making)   Mr Castro is a 43 yo man with a PMH of asthma and dialy smoking who presents today with dyspnea and body aches.  He has a few wheezes on exam, is not hypoxic.  He is distressed by the body aches.  DDX includes ACS, pneumonia, asthma exacerbation, viral illness, CHF. No B lines and good heart function on POCUS.  Low risk for PE.  EKG appears WNL.  Will obtain troponin, CXR, labs, VBG, give duonebs and steroids, reassess.    Progress note: CXR shows no pneumonia, labs WNL, VBG shows respiratory alkalosis.  Patient appears improved.  Suspect asthma exacerbation in setting of viral illness. Plan to discharge with steroids, patient has albuterol inhaler at home, understands to f/u with PMD and return to ED for  new/worsening symptoms.  Lisa Lewis MD on 4/11/2019 at 11:38 AM     I have reviewed the nursing notes.    I have reviewed the findings, diagnosis, plan and need for follow up with the patient.       Medication List      Started    predniSONE 20 MG tablet  Commonly known as:  DELTASONE  Take two tablets (= 40mg) each day for 4 (four) days starting tomorrow        ASK your doctor about these medications    benzonatate 200 MG capsule  Commonly known as:  TESSALON  200 mg, Oral, 3 TIMES DAILY PRN  Ask about: Should I take this medication?            Final diagnoses:   Dyspnea, unspecified type   Exacerbation of intermittent asthma, unspecified asthma severity       4/9/2019   [facility], [place], EMERGENCY DEPARTMENT     Lisa Lewis MD  04/11/19 1132

## 2019-04-11 ASSESSMENT — ENCOUNTER SYMPTOMS
CHEST TIGHTNESS: 1
FEVER: 0
VOMITING: 0
NAUSEA: 0
WEAKNESS: 0
HEADACHES: 0
COUGH: 1
DIARRHEA: 0
NECK STIFFNESS: 0
CONFUSION: 0
NECK PAIN: 0
DYSURIA: 0
SHORTNESS OF BREATH: 1
ABDOMINAL PAIN: 0
NUMBNESS: 0

## 2019-10-08 ENCOUNTER — OFFICE VISIT (OUTPATIENT)
Dept: FAMILY MEDICINE | Facility: CLINIC | Age: 44
End: 2019-10-08
Payer: COMMERCIAL

## 2019-10-08 VITALS
DIASTOLIC BLOOD PRESSURE: 62 MMHG | SYSTOLIC BLOOD PRESSURE: 130 MMHG | TEMPERATURE: 98.5 F | HEART RATE: 87 BPM | BODY MASS INDEX: 28.48 KG/M2 | WEIGHT: 210 LBS | OXYGEN SATURATION: 98 %

## 2019-10-08 DIAGNOSIS — R21 RASH AND NONSPECIFIC SKIN ERUPTION: Primary | ICD-10-CM

## 2019-10-08 PROCEDURE — 99214 OFFICE O/P EST MOD 30 MIN: CPT | Performed by: FAMILY MEDICINE

## 2019-10-08 RX ORDER — CLOTRIMAZOLE 1 %
CREAM (GRAM) TOPICAL 2 TIMES DAILY
Qty: 40 G | Refills: 1 | Status: SHIPPED | OUTPATIENT
Start: 2019-10-08

## 2019-10-08 RX ORDER — TRIAMCINOLONE ACETONIDE 1 MG/G
CREAM TOPICAL 2 TIMES DAILY
Qty: 45 G | Refills: 0 | Status: SHIPPED | OUTPATIENT
Start: 2019-10-08

## 2019-10-08 NOTE — PROGRESS NOTES
Subjective     Sebastian Castro is a 44 year old male who presents to clinic today for the following health issues:    HPI   Rash  Onset: over a month     Description:   Location: inside of both thighs, groin area   Character: round, red  Itching (Pruritis): YES    Progression of Symptoms:  same and constant    Accompanying Signs & Symptoms:  Fever: no   Body aches or joint pain: no   Sore throat symptoms: no   Recent cold symptoms: no     History:   Previous similar rash: no     Precipitating factors:   Exposure to similar rash: no   New exposures: None   Recent travel: no     Alleviating factors: no     Therapies Tried and outcome: hydrocortisone cream, Vaseline,      For one month he has had a rash on b/l inner thighs, groin and around the perineum. Rash is pruritic. Due to itching rash is now painful. He has tried cortisone, Vaseline and diaper rash which haven't been helpful. He takes a shower every day. He did change detergents. Over the last week he has noticed drainage in the inner groin.     Reviewed and updated as needed this visit by Provider         Review of Systems   ROS COMP: Constitutional, HEENT, cardiovascular, pulmonary, gi and gu systems are negative, except as otherwise noted.      Objective    There were no vitals taken for this visit.  There is no height or weight on file to calculate BMI.  Physical Exam   /62   Pulse 87   Temp 98.5  F (36.9  C) (Oral)   Wt 95.3 kg (210 lb)   SpO2 98%   BMI 28.48 kg/m    GENERAL: healthy, alert and no distress  EYES: Eyes grossly normal to inspection  HENT: nose and mouth without ulcers or lesions  MS: no gross musculoskeletal defects noted  SKIN: b/l erythematous patch on inner groin folds and rectal area     Diagnostic Test Results:        Assessment & Plan       Rash and nonspecific skin eruption  - recommended trial of below   - triamcinolone (KENALOG) 0.1 % external cream; Apply topically 2 times daily  Dispense: 45 g; Refill: 0  - clotrimazole  (LOTRIMIN) 1 % external cream; Apply topically 2 times daily  Dispense: 40 g; Refill: 1  - advised below  Please stop using Vaseline and hydrocortisone    Ok to use over the counter zyrtec or Claritin daily as needed for itching.   Triamcinolone small amount twice daily for 14 days  Clotrimazole twice daily for up to 6 weeks  Call me if there is no improvement over the next one week     Return in about 1 week (around 10/15/2019) for sympotms are not improving.    Elisa Paez MD  [facility]

## 2019-10-08 NOTE — PATIENT INSTRUCTIONS
Please stop using Vaseline and hydrocortisone    Ok to use over the counter zyrtec or Claritin daily as needed for itching.   Triamcinolone small amount twice daily for 14 days  Clotrimazole twice daily for up to 6 weeks  Call me if there is no improvement over the next one week

## 2020-01-21 ENCOUNTER — OFFICE VISIT (OUTPATIENT)
Dept: FAMILY MEDICINE | Facility: CLINIC | Age: 45
End: 2020-01-21
Payer: COMMERCIAL

## 2020-01-21 VITALS
DIASTOLIC BLOOD PRESSURE: 74 MMHG | TEMPERATURE: 98 F | WEIGHT: 215.5 LBS | HEART RATE: 94 BPM | SYSTOLIC BLOOD PRESSURE: 122 MMHG | OXYGEN SATURATION: 97 % | RESPIRATION RATE: 16 BRPM | BODY MASS INDEX: 29.23 KG/M2

## 2020-01-21 DIAGNOSIS — R05.9 COUGH: ICD-10-CM

## 2020-01-21 DIAGNOSIS — Z72.0 TOBACCO ABUSE: ICD-10-CM

## 2020-01-21 DIAGNOSIS — R50.9 FEVER, UNSPECIFIED FEVER CAUSE: ICD-10-CM

## 2020-01-21 DIAGNOSIS — J40 BRONCHITIS: Primary | ICD-10-CM

## 2020-01-21 DIAGNOSIS — J45.21 MILD INTERMITTENT ASTHMA WITH EXACERBATION: ICD-10-CM

## 2020-01-21 LAB
FLUAV+FLUBV AG SPEC QL: NEGATIVE
FLUAV+FLUBV AG SPEC QL: NEGATIVE
SPECIMEN SOURCE: NORMAL

## 2020-01-21 PROCEDURE — 99214 OFFICE O/P EST MOD 30 MIN: CPT | Performed by: NURSE PRACTITIONER

## 2020-01-21 PROCEDURE — 87804 INFLUENZA ASSAY W/OPTIC: CPT | Performed by: NURSE PRACTITIONER

## 2020-01-21 RX ORDER — AZITHROMYCIN 250 MG/1
TABLET, FILM COATED ORAL
Qty: 6 TABLET | Refills: 0 | Status: SHIPPED | OUTPATIENT
Start: 2020-01-21

## 2020-01-21 RX ORDER — PREDNISONE 20 MG/1
20 TABLET ORAL 2 TIMES DAILY
Qty: 10 TABLET | Refills: 0 | Status: SHIPPED | OUTPATIENT
Start: 2020-01-21

## 2020-01-21 NOTE — PATIENT INSTRUCTIONS
Flu swab was negative    I suspect this is bronchitis which is triggering your asthma.  Often bronchitis is viral and will resolve on its own but given your fever and history of hospitalization I recommend we treat you with an antibiotic called azithromycin.    To help with the asthma lets start prednisone 1 pill twice a day for 5 days.  Continue to use your albuterol every 4 hours until the cough is better.    Follow up if worsening or not better by Friday.  If you cant breathe go to the ER.    Quitting smoking is the best thing you can do for your health.
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
## 2020-01-21 NOTE — LETTER
[place] [id number] [address]  Phone: [phone]    January 21, 2020        Sebastian Castro  [address] [place] [id number]          To whom it may concern:    RE: Sebastian Castro    Patient was seen and treated today at our clinic and missed work.  He can return to work once he has been afebrile for 24 hours (probably Thursday 1/23/20).    Please contact me for questions or concerns.      Sincerely,        IRENE Jimenez CNP

## 2023-04-11 ENCOUNTER — LAB REQUISITION (OUTPATIENT)
Dept: LAB | Facility: CLINIC | Age: 48
End: 2023-04-11

## 2023-04-11 LAB — ERYTHROCYTE [SEDIMENTATION RATE] IN BLOOD BY WESTERGREN METHOD: 12 MM/HR (ref 0–15)

## 2023-04-11 PROCEDURE — 85652 RBC SED RATE AUTOMATED: CPT | Performed by: PHYSICIAN ASSISTANT

## 2023-04-11 PROCEDURE — 80053 COMPREHEN METABOLIC PANEL: CPT | Performed by: PHYSICIAN ASSISTANT

## 2023-04-11 PROCEDURE — 86038 ANTINUCLEAR ANTIBODIES: CPT | Performed by: PHYSICIAN ASSISTANT

## 2023-04-11 PROCEDURE — 86140 C-REACTIVE PROTEIN: CPT | Performed by: PHYSICIAN ASSISTANT

## 2023-04-12 LAB
ALBUMIN SERPL BCG-MCNC: 4.6 G/DL (ref 3.5–5.2)
ALP SERPL-CCNC: 85 U/L (ref 40–129)
ALT SERPL W P-5'-P-CCNC: 57 U/L (ref 10–50)
ANA SER QL IF: NEGATIVE
ANION GAP SERPL CALCULATED.3IONS-SCNC: 14 MMOL/L (ref 7–15)
AST SERPL W P-5'-P-CCNC: 20 U/L (ref 10–50)
BILIRUB SERPL-MCNC: 0.2 MG/DL
BUN SERPL-MCNC: 11.6 MG/DL (ref 6–20)
CALCIUM SERPL-MCNC: 9.7 MG/DL (ref 8.6–10)
CHLORIDE SERPL-SCNC: 101 MMOL/L (ref 98–107)
CREAT SERPL-MCNC: 1 MG/DL (ref 0.67–1.17)
CRP SERPL-MCNC: 3.68 MG/L
DEPRECATED HCO3 PLAS-SCNC: 22 MMOL/L (ref 22–29)
GFR SERPL CREATININE-BSD FRML MDRD: >90 ML/MIN/1.73M2
GLUCOSE SERPL-MCNC: 120 MG/DL (ref 70–99)
POTASSIUM SERPL-SCNC: 5.1 MMOL/L (ref 3.4–5.3)
PROT SERPL-MCNC: 7 G/DL (ref 6.4–8.3)
SODIUM SERPL-SCNC: 137 MMOL/L (ref 136–145)